# Patient Record
Sex: MALE | Race: ASIAN | URBAN - METROPOLITAN AREA
[De-identification: names, ages, dates, MRNs, and addresses within clinical notes are randomized per-mention and may not be internally consistent; named-entity substitution may affect disease eponyms.]

---

## 2021-08-17 ENCOUNTER — OFFICE VISIT (OUTPATIENT)
Dept: FAMILY MEDICINE | Facility: CLINIC | Age: 41
End: 2021-08-17
Payer: COMMERCIAL

## 2021-08-17 VITALS
TEMPERATURE: 98.8 F | DIASTOLIC BLOOD PRESSURE: 85 MMHG | OXYGEN SATURATION: 98 % | RESPIRATION RATE: 16 BRPM | SYSTOLIC BLOOD PRESSURE: 139 MMHG | HEART RATE: 101 BPM

## 2021-08-17 DIAGNOSIS — Z20.822 EXPOSURE TO COVID-19 VIRUS: Primary | ICD-10-CM

## 2021-08-17 DIAGNOSIS — R53.81 MALAISE: ICD-10-CM

## 2021-08-17 DIAGNOSIS — R09.81 NASAL CONGESTION: ICD-10-CM

## 2021-08-17 PROCEDURE — 99203 OFFICE O/P NEW LOW 30 MIN: CPT | Mod: GC | Performed by: STUDENT IN AN ORGANIZED HEALTH CARE EDUCATION/TRAINING PROGRAM

## 2021-08-17 PROCEDURE — U0005 INFEC AGEN DETEC AMPLI PROBE: HCPCS | Performed by: STUDENT IN AN ORGANIZED HEALTH CARE EDUCATION/TRAINING PROGRAM

## 2021-08-17 PROCEDURE — U0003 INFECTIOUS AGENT DETECTION BY NUCLEIC ACID (DNA OR RNA); SEVERE ACUTE RESPIRATORY SYNDROME CORONAVIRUS 2 (SARS-COV-2) (CORONAVIRUS DISEASE [COVID-19]), AMPLIFIED PROBE TECHNIQUE, MAKING USE OF HIGH THROUGHPUT TECHNOLOGIES AS DESCRIBED BY CMS-2020-01-R: HCPCS | Performed by: STUDENT IN AN ORGANIZED HEALTH CARE EDUCATION/TRAINING PROGRAM

## 2021-08-17 RX ORDER — ACETAMINOPHEN 500 MG
500-1000 TABLET ORAL EVERY 6 HOURS PRN
Qty: 40 TABLET | Refills: 1 | Status: SHIPPED | OUTPATIENT
Start: 2021-08-17

## 2021-08-17 RX ORDER — FLUTICASONE PROPIONATE 50 MCG
2 SPRAY, SUSPENSION (ML) NASAL DAILY
Qty: 18.2 ML | Refills: 0 | Status: SHIPPED | OUTPATIENT
Start: 2021-08-17

## 2021-08-17 RX ORDER — IBUPROFEN 400 MG/1
400-800 TABLET, FILM COATED ORAL EVERY 6 HOURS PRN
Qty: 40 TABLET | Refills: 1 | Status: SHIPPED | OUTPATIENT
Start: 2021-08-17

## 2021-08-17 NOTE — LETTER
August 17, 2021      Billy Chang  [address]   [place] [address]        To Whom It May Concern:    Billy Chang  was seen on 08/17/21.  Please excuse him  until 8/20/21 due to illness.        Sincerely,        Rosario Trujillo MD

## 2021-08-17 NOTE — PROGRESS NOTES
[facility] Medicine Office Visit Note    Patient: Billy Chang  : 1980  MRN: [id number]    Assessment & Plan        Exposure to COVID-19 virus  Nasal congestion  Malaise  His Covid result is negative.  Advised supportive cares including hydration and ibuprofen and Tylenol as needed for mild pain or fever.  Work note provided for 3 days while awaiting results of Covid test.  Recommended Covid vaccination when symptoms improve.  - Symptomatic COVID-19 Virus (Coronavirus) by PCR Nasopharyngeal  - acetaminophen (TYLENOL) 500 MG tablet; Take 1-2 tablets (500-1,000 mg) by mouth every 6 hours as needed for mild pain  - ibuprofen (ADVIL/MOTRIN) 400 MG tablet; Take 1-2 tablets (400-800 mg) by mouth every 6 hours as needed for moderate pain  - fluticasone (FLONASE) 50 MCG/ACT nasal spray; Spray 2 sprays into both nostrils daily              Follow Up  No follow-ups on file.          I precepted today with attending physician, Dr. Misha Woods MD.      Rosario Trujillo MD  PGY-2  [facility]  Pager: [phone]  Clinic: [phone]  Fax: [phone]          Subjective      Billy Chang is a 40 year old with no known past medical history who presented with 1 week of subjective fever, chills, sneezing, congestion, and body aches in the setting of recent exposure to a person with COVID-19 infection at work.  There is no associated diarrhea, loss of taste or smell, or vomiting.  He has not been vaccinated against COVID-19.  He has not tried taking any medications and does not have a thermometer at home.  His 17-month-old son has been having similar symptoms.  The son does not attend .  Chief Complaint   Patient presents with     Sick     feeling hot, cough, sneezing, body aches, no COVID vaccine          A Javier Zabrina  was used for this visit.       PMH, PSH, Meds, Allergies, and FHx were reviewed and updated as needed.         Objective      Vitals:    21 0927   BP:  139/85   BP Location: Left arm   Patient Position: Sitting   Cuff Size: Adult Regular   Pulse: 101   Resp: 16   Temp: 98.8  F (37.1  C)   TempSrc: Oral   SpO2: 98%     There is no height or weight on file to calculate BMI.    Physical Exam     GENERAL APPEARANCE: alert, mild distress and fatigued  EYES: Red and watery  HENT: ear canals and TM's normal, nasal mucosa swollen, tonsillar erythema present, beetle nut pieces in oral cavities and on teeth  NECK: Mild bilateral submandibular adenopathy  RESP: Clear to auscultation bilaterally in posterior lung fields.  No wheezing or crackles.  CV: tachycardia and no appreciable murmurs  SKIN: no suspicious lesions or rashes    Results     Results for orders placed or performed in visit on 08/17/21   Symptomatic COVID-19 Virus (Coronavirus) by PCR Nasopharyngeal     Status: Normal    Specimen: Nasopharyngeal; Swab   Result Value Ref Range    SARS CoV2 PCR Negative Negative    Narrative    Testing was performed using the Xpert Xpress SARS-CoV-2 Assay on the  Cepheid Gene-Xpert Instrument Systems. Additional information about  this Emergency Use Authorization (EUA) assay can be found via the Lab  Guide. This test should be ordered for the detection of SARS-CoV-2 in  individuals who meet SARS-CoV-2 clinical and/or epidemiological  criteria. Test performance is unknown in asymptomatic patients. This  test is for in vitro diagnostic use under the FDA EUA for  laboratories certified under CLIA to perform high complexity testing.  This test has not been FDA cleared or approved. A negative result  does not rule out the presence of PCR inhibitors in the specimen or  target RNA in concentration below the limit of detection for the  assay. The possibility of a false negative should be considered if  the patient's recent exposure or clinical presentation suggests  COVID-19. This test was validated by the [facility]. This laboratory is  certified under  the Clinical Laboratory Improvement Amendments of 1988 (CLIA-88) as  qualified to perform high complexity laboratory testing.              ----- Service Performed and Documented by Resident or Fellow ------

## 2021-08-17 NOTE — NURSING NOTE
Due to patient being non-English speaking/uses sign language, an  was used for this visit. Only for face-to-face interpretation by an external agency, date and length of interpretation can be found on the scanned worksheet.     name: Emilia Brooks  Language: Zabrina  Agency: [employer]  Phone number: [phone]      JESENIA Paiz  9:38 AM  8/17/2021

## 2021-08-17 NOTE — PROGRESS NOTES
Preceptor Attestation:    I discussed the patient with the resident and evaluated the patient in person. I have verified the content of the note, which accurately reflects my assessment of the patient and the plan of care.   Supervising Physician:  Misha Woods MD.

## 2021-08-17 NOTE — PATIENT INSTRUCTIONS
Dear Billy Chang,    Thank you for coming to see us today!     1. You were seen today for possible covid infection  2. Take ibuprofen and tylenol as needed   3. Use flonase for nasal congestion  4. Stay hydrated and rest        As always, please call the clinic if you have any questions or concerns.    Be well,    Dr. Rosario Trujillo

## 2021-08-18 LAB — SARS-COV-2 RNA RESP QL NAA+PROBE: NEGATIVE
